# Patient Record
Sex: MALE | Race: WHITE | HISPANIC OR LATINO | Employment: FULL TIME | URBAN - METROPOLITAN AREA
[De-identification: names, ages, dates, MRNs, and addresses within clinical notes are randomized per-mention and may not be internally consistent; named-entity substitution may affect disease eponyms.]

---
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
## 2022-02-17 ENCOUNTER — HOSPITAL ENCOUNTER (OUTPATIENT)
Dept: RADIOLOGY | Facility: MEDICAL CENTER | Age: 22
End: 2022-02-17
Attending: FAMILY MEDICINE
Payer: COMMERCIAL

## 2022-02-17 DIAGNOSIS — R55 SYNCOPE AND COLLAPSE: ICD-10-CM

## 2022-02-17 PROCEDURE — 70544 MR ANGIOGRAPHY HEAD W/O DYE: CPT

## 2022-03-27 ENCOUNTER — HOSPITAL ENCOUNTER (OUTPATIENT)
Dept: RADIOLOGY | Facility: MEDICAL CENTER | Age: 22
End: 2022-03-27
Attending: FAMILY MEDICINE
Payer: COMMERCIAL

## 2022-03-27 DIAGNOSIS — R55 SYNCOPE AND COLLAPSE: ICD-10-CM

## 2022-03-27 PROCEDURE — 700117 HCHG RX CONTRAST REV CODE 255: Performed by: FAMILY MEDICINE

## 2022-03-27 PROCEDURE — A9576 INJ PROHANCE MULTIPACK: HCPCS | Performed by: FAMILY MEDICINE

## 2022-03-27 PROCEDURE — 70553 MRI BRAIN STEM W/O & W/DYE: CPT

## 2022-03-27 RX ADMIN — GADOTERIDOL 14 ML: 279.3 INJECTION, SOLUTION INTRAVENOUS at 14:14

## 2023-08-04 ENCOUNTER — OFFICE VISIT (OUTPATIENT)
Dept: URGENT CARE | Facility: CLINIC | Age: 23
End: 2023-08-04
Payer: COMMERCIAL

## 2023-08-04 VITALS
SYSTOLIC BLOOD PRESSURE: 102 MMHG | WEIGHT: 165 LBS | RESPIRATION RATE: 14 BRPM | BODY MASS INDEX: 29.23 KG/M2 | HEIGHT: 63 IN | TEMPERATURE: 97.3 F | DIASTOLIC BLOOD PRESSURE: 74 MMHG | HEART RATE: 74 BPM | OXYGEN SATURATION: 98 %

## 2023-08-04 DIAGNOSIS — L65.9 HAIR LOSS: ICD-10-CM

## 2023-08-04 PROCEDURE — 3078F DIAST BP <80 MM HG: CPT

## 2023-08-04 PROCEDURE — 3074F SYST BP LT 130 MM HG: CPT

## 2023-08-04 PROCEDURE — 99203 OFFICE O/P NEW LOW 30 MIN: CPT

## 2023-08-04 ASSESSMENT — FIBROSIS 4 INDEX: FIB4 SCORE: 0.32

## 2023-08-04 NOTE — PROGRESS NOTES
"Subjective:   Tejas Diez is a 22 y.o. male who presents for Other (Pt has bald patches on scalp x 2 weeks )      HPI: This is a 22-year-old male who presents today for hair loss.  This is a new problem.  Patient reports noticing that his hair has been falling out in patches over the last 2 weeks.  He has not experienced symptoms in the past.  He denies itching or pain.  He denies fatigue.  No underlying medical conditions.  No new medications.  Patient reports that he does not have routine PCP.    Review of Systems   Constitutional:  Negative for malaise/fatigue.   Skin:         + Hair loss       Medications:    Current Outpatient Medications on File Prior to Visit   Medication Sig Dispense Refill    Cholecalciferol (D3 PO) Take 1 Capsule by mouth every day.      BIOTIN PO Take 1 Tablet by mouth every day.      Cyanocobalamin (B-12 PO) Take 1 Tablet by mouth every day. (Patient not taking: Reported on 8/4/2023)      VITAMIN A PO Take 1 Capsule by mouth every day. (Patient not taking: Reported on 8/4/2023)       No current facility-administered medications on file prior to visit.        Allergies:   Patient has no known allergies.    Problem List:   Patient Active Problem List   Diagnosis    No active medical problems        Surgical History:  No past surgical history on file.    Past Social Hx:   Social History     Tobacco Use    Smoking status: Never    Smokeless tobacco: Never   Vaping Use    Vaping Use: Never used   Substance Use Topics    Alcohol use: Not Currently    Drug use: Not Currently          Problem list, medications, and allergies reviewed by myself today in Epic.     Objective:     /74 (BP Location: Left arm, Patient Position: Sitting, BP Cuff Size: Adult)   Pulse 74   Temp 36.3 °C (97.3 °F) (Temporal)   Resp 14   Ht 1.6 m (5' 3\")   Wt 74.8 kg (165 lb)   SpO2 98%   BMI 29.23 kg/m²     Physical Exam  Vitals and nursing note reviewed.   Constitutional:       General: He is not in " acute distress.     Appearance: Normal appearance. He is normal weight. He is not ill-appearing, toxic-appearing or diaphoretic.   HENT:      Head: Normocephalic and atraumatic.        Comments: Circular patches of bald spots.  No erythema, no flaking, no excoriation, no pustules  Cardiovascular:      Rate and Rhythm: Normal rate and regular rhythm.      Pulses: Normal pulses.      Heart sounds: Normal heart sounds. No murmur heard.     No friction rub. No gallop.   Pulmonary:      Effort: Pulmonary effort is normal. No respiratory distress.      Breath sounds: Normal breath sounds. No stridor. No wheezing, rhonchi or rales.   Chest:      Chest wall: No tenderness.   Musculoskeletal:      Cervical back: Normal range of motion and neck supple. No tenderness.   Lymphadenopathy:      Cervical: No cervical adenopathy.   Skin:     General: Skin is warm and dry.      Capillary Refill: Capillary refill takes less than 2 seconds.   Neurological:      General: No focal deficit present.      Mental Status: He is alert and oriented to person, place, and time. Mental status is at baseline.   Psychiatric:         Mood and Affect: Mood normal.         Behavior: Behavior normal.         Thought Content: Thought content normal.         Judgment: Judgment normal.       Assessment/Plan:     Diagnosis and associated orders:   1. Hair loss  Referral to establish with [facility] PCP            Comments/MDM:   Pt is clinically stable at today's acute urgent care visit.  No acute distress noted. Appropriate for outpatient management at this time.     Acute problem.  Patient presents today for hair loss over the last 2 weeks.  Patient is without any other symptoms.  Discussed differentials to include alopecia with patient.  Referral placed for patient to follow-up with PCP for further evaluation and management as patient reports that he does not have routine PCP.  Discussed possibility of needing labs to rule out any other medical conditions  resulting in hair loss.  Patient is agreeable with this plan of care verbalizes good understanding.           Discussed DDx, management options (risks,benefits, and alternatives to planned treatment), natural progression and supportive care.  Expressed understanding and the treatment plan was agreed upon. Questions were encouraged and answered   Return to urgent care prn if new or worsening sx or if there is no improvement in condition prn.    Educated in Red flags and indications to immediately call 911 or present to the Emergency Department.   Advised the patient to follow-up with the primary care physician for recheck, reevaluation, and consideration of further management.    I personally reviewed prior external notes and test results pertinent to today's visit.  I have independently reviewed and interpreted all diagnostics ordered during this urgent care acute visit.     Please note that this dictation was created using voice recognition software. I have made a reasonable attempt to correct obvious errors, but I expect that there are errors of grammar and possibly content that I did not discover before finalizing the note.    This note was electronically signed by JAYNA Saunders

## 2023-08-06 ASSESSMENT — ENCOUNTER SYMPTOMS: ROS SKIN COMMENTS: + HAIR LOSS

## 2023-08-23 ENCOUNTER — TELEPHONE (OUTPATIENT)
Dept: HEALTH INFORMATION MANAGEMENT | Facility: OTHER | Age: 23
End: 2023-08-23
Payer: COMMERCIAL

## 2023-10-10 ENCOUNTER — OFFICE VISIT (OUTPATIENT)
Dept: MEDICAL GROUP | Facility: LAB | Age: 23
End: 2023-10-10
Payer: COMMERCIAL

## 2023-10-10 VITALS
WEIGHT: 165.4 LBS | SYSTOLIC BLOOD PRESSURE: 108 MMHG | RESPIRATION RATE: 14 BRPM | HEART RATE: 72 BPM | BODY MASS INDEX: 29.3 KG/M2 | OXYGEN SATURATION: 99 % | DIASTOLIC BLOOD PRESSURE: 62 MMHG | HEIGHT: 63 IN | TEMPERATURE: 97 F

## 2023-10-10 DIAGNOSIS — L40.9 PSORIASIS: ICD-10-CM

## 2023-10-10 DIAGNOSIS — Z87.898 HISTORY OF SYNCOPE: ICD-10-CM

## 2023-10-10 DIAGNOSIS — L65.9 PATCHY LOSS OF HAIR: ICD-10-CM

## 2023-10-10 PROCEDURE — 3078F DIAST BP <80 MM HG: CPT | Performed by: NURSE PRACTITIONER

## 2023-10-10 PROCEDURE — 3074F SYST BP LT 130 MM HG: CPT | Performed by: NURSE PRACTITIONER

## 2023-10-10 PROCEDURE — 99214 OFFICE O/P EST MOD 30 MIN: CPT | Performed by: NURSE PRACTITIONER

## 2023-10-10 RX ORDER — CLOBETASOL PROPIONATE 0.5 MG/G
1 OINTMENT TOPICAL 2 TIMES DAILY
Qty: 60 G | Refills: 0 | Status: SHIPPED | OUTPATIENT
Start: 2023-10-10

## 2023-10-10 ASSESSMENT — PATIENT HEALTH QUESTIONNAIRE - PHQ9: CLINICAL INTERPRETATION OF PHQ2 SCORE: 0

## 2023-10-10 ASSESSMENT — FIBROSIS 4 INDEX: FIB4 SCORE: 0.33

## 2023-10-10 NOTE — NON-PROVIDER
"Subjective:     CC:    Chief Complaint   Patient presents with    Establish Care    Hair/Scalp Problem     Bald patch        HISTORY OF THE PRESENT ILLNESS: Patient is a 23 y.o. male. This pleasant patient is here today to establish care and discuss the following:      -Reviewed all past medical history, family history, social history.  -Reviewed all screening/vaccinations:    Childhood vaccines: UTD other than HPV. Pt declines HPV vaccine   Tetanus: UTD   Flu: refuses    PHQ2: 0  -Diet and Exercise: exercises regularly, well-balanced.   -Tobacco, alcohol, recreational drug use:  reports that he has never smoked. He has never used smokeless tobacco.  -Sexually active:  has no history on file for sexual activity.   -Occupation:      Hair loss:   Onset a couple months ago. First noticed it on the R side of his head. A few weeks later he found a bald spot low on his scalp. It is itchy. Denies pulling out hair. Hasn't notice any lesions to the area. Denies hair loss elsewhere on body.   Tried Rogaine for about a month and a half with no improvement.   Wants referral to dermatologist.     Syncope:   This has been going on since he was 7. It tends to happen when he gets too hot or if he is staring at something. It doesn't happen very frequently, about once a year. It doesn't seem to occur specifically with changes in positions. It can happen at rest, when standing, or when exerting himself. Has fallen and hit his head before. He cannot feel it coming on. He states it sometimes takes 5-10 minutes to come out of it.   They have done multiple evaluations in the past such as tilt tests, \"heart tests,\" brain MRI. All the tests were negative. He said that paramedics have taken his BP when it has happened and said that it was low, but that the doctors didn't seem to mention this being a cause.     ROS:   Gen: no fevers/chills, no changes in weight  Eyes: no changes in vision  ENT: no sore throat, no hearing loss, no bloody " "nose  Pulm: no sob, no cough  CV: no chest pain, no palpitations  GI: no nausea/vomiting, no diarrhea  : no dysuria  MSk: no myalgias  Skin: no rash  Neuro: no headaches, no numbness/tingling  Heme/Lymph: no easy bruising        - NOTE: All other systems reviewed and are negative, except as in HPI.      Objective:     Exam: /62 (BP Location: Right arm, Patient Position: Sitting, BP Cuff Size: Adult)   Pulse 72   Temp 36.1 °C (97 °F)   Resp 14   Ht 1.6 m (5' 3\")   Wt 75 kg (165 lb 6.4 oz)   SpO2 99%  Body mass index is 29.3 kg/m².    Constitutional: Alert, no distress, well-groomed.  Skin: Warm, dry, good turgor. R posterior lower scalp: patchy area of hair loss. White flaking of skin. No hair follicles visualized in area of hair loss.   Eye: Equal, round and reactive, conjunctiva clear, lids normal.  ENMT: Lips without lesions, good dentition, moist mucous membranes.  Neck: Trachea midline, no masses, no thyromegaly.  Respiratory: Unlabored respiratory effort, no cough.  MSK: Normal gait, moves all extremities.  Neuro: Grossly non-focal.   Psych: Alert and oriented x3, normal affect and mood.         Assessment & Plan:   23 y.o. male with the following -    1. Patchy loss of hair  - Discussed that pt can try clobetasol first to see if there is improvement and establish with dermatology for further evaluation if not resolving or is worsening.  - Referral to Dermatology  - clobetasol (TEMOVATE) 0.05 % Ointment; Apply 1 Application topically 2 times a day.  Dispense: 60 g; Refill: 0    2. Psoriasis  - Physical exam and HPI are consistent with hair loss 2/2 to psoriasis. Discussed importance of not scratching scalp as this can exacerbate symptoms. Discussed patho of psoriasis including possible flares in the same spot or new ones and reasoning for steroid cream for treatment. Rx for clobetasol provided with instructions to use 2 times a day for 3 weeks followed by a 2 week break. He can do another round of " 3 weeks after the 2 week break if needed.   - Referral to Dermatology  - clobetasol (TEMOVATE) 0.05 % Ointment; Apply 1 Application topically 2 times a day.  Dispense: 60 g; Refill: 0    3. History of syncope  - Will do basic lab work up for further evaluation of chronic syncopal episodes. Pt has not had an episode so far this year. Consider neurology referral for repeat syncopal event.   - CBC WITH DIFFERENTIAL; Future  - Comp Metabolic Panel; Future  - HEMOGLOBIN A1C; Future  - TSH; Future  - FREE THYROXINE; Future

## 2023-10-11 NOTE — PROGRESS NOTES
I saw the patient with Angie Pompa, student. I performed a physical exam and medical decision making. I reviewed and verified the documentation on 10/10/23 and agree with the content and plan as written by the student.